# Patient Record
Sex: FEMALE | Race: WHITE | HISPANIC OR LATINO | Employment: FULL TIME | URBAN - METROPOLITAN AREA
[De-identification: names, ages, dates, MRNs, and addresses within clinical notes are randomized per-mention and may not be internally consistent; named-entity substitution may affect disease eponyms.]

---

## 2019-06-01 ENCOUNTER — OFFICE VISIT (OUTPATIENT)
Dept: URGENT CARE | Facility: CLINIC | Age: 34
End: 2019-06-01
Payer: COMMERCIAL

## 2019-06-01 VITALS
HEIGHT: 63 IN | HEART RATE: 82 BPM | DIASTOLIC BLOOD PRESSURE: 71 MMHG | TEMPERATURE: 98 F | SYSTOLIC BLOOD PRESSURE: 104 MMHG | OXYGEN SATURATION: 98 % | WEIGHT: 220 LBS | BODY MASS INDEX: 38.98 KG/M2

## 2019-06-01 DIAGNOSIS — L23.7 CONTACT DERMATITIS DUE TO POISON IVY: Primary | ICD-10-CM

## 2019-06-01 PROCEDURE — 3008F BODY MASS INDEX DOCD: CPT | Mod: CPTII,S$GLB,, | Performed by: PHYSICIAN ASSISTANT

## 2019-06-01 PROCEDURE — 3008F PR BODY MASS INDEX (BMI) DOCUMENTED: ICD-10-PCS | Mod: CPTII,S$GLB,, | Performed by: PHYSICIAN ASSISTANT

## 2019-06-01 PROCEDURE — 99203 OFFICE O/P NEW LOW 30 MIN: CPT | Mod: S$GLB,,, | Performed by: PHYSICIAN ASSISTANT

## 2019-06-01 PROCEDURE — 99203 PR OFFICE/OUTPT VISIT, NEW, LEVL III, 30-44 MIN: ICD-10-PCS | Mod: S$GLB,,, | Performed by: PHYSICIAN ASSISTANT

## 2019-06-01 RX ORDER — PREDNISONE 10 MG/1
TABLET ORAL
Qty: 40 TABLET | Refills: 0 | Status: SHIPPED | OUTPATIENT
Start: 2019-06-01 | End: 2019-06-17

## 2019-06-01 RX ORDER — CLOBETASOL PROPIONATE 0.5 MG/G
CREAM TOPICAL 2 TIMES DAILY
Qty: 30 G | Refills: 1 | Status: SHIPPED | OUTPATIENT
Start: 2019-06-01

## 2019-06-01 RX ORDER — FLUOXETINE HYDROCHLORIDE 40 MG/1
40 CAPSULE ORAL DAILY
COMMUNITY

## 2019-06-01 RX ORDER — HYDROXYZINE PAMOATE 25 MG/1
25 CAPSULE ORAL 4 TIMES DAILY
Qty: 16 CAPSULE | Refills: 0 | Status: SHIPPED | OUTPATIENT
Start: 2019-06-01

## 2019-06-01 NOTE — PROGRESS NOTES
"Subjective:       Patient ID: Anel Park is a 34 y.o. female.    Vitals:  height is 5' 3" (1.6 m) and weight is 99.8 kg (220 lb). Her temperature is 97.7 °F (36.5 °C). Her blood pressure is 104/71 and her pulse is 82. Her oxygen saturation is 98%.     Chief Complaint: Rash (poison ivy )    Pt was working in garden at home on Wednesday and came in contact with poison ivy, pt now states rash is spreading all over body and itching, no other symptoms     Rash   This is a new problem. The current episode started in the past 7 days. The problem has been gradually worsening since onset. The affected locations include the abdomen, face, neck and chest. The rash is characterized by itchiness. She was exposed to plant contact. Pertinent negatives include no congestion, cough, diarrhea, fatigue, fever, shortness of breath, sore throat or vomiting. Past treatments include antihistamine and anti-itch cream. The treatment provided mild relief.       Constitution: Negative for chills, fatigue and fever.   HENT: Negative for congestion and sore throat.    Neck: Negative for painful lymph nodes.   Cardiovascular: Negative for chest pain and leg swelling.   Eyes: Negative for double vision and blurred vision.   Respiratory: Negative for cough and shortness of breath.    Gastrointestinal: Negative for nausea, vomiting and diarrhea.   Genitourinary: Negative for dysuria, frequency, urgency and history of kidney stones.   Musculoskeletal: Negative for joint pain, joint swelling, muscle cramps and muscle ache.   Skin: Positive for rash. Negative for color change, pale, erythema and bruising.   Allergic/Immunologic: Positive for itching. Negative for seasonal allergies.   Neurological: Negative for dizziness, history of vertigo, light-headedness, passing out and headaches.   Hematologic/Lymphatic: Negative for swollen lymph nodes.   Psychiatric/Behavioral: Negative for nervous/anxious, sleep disturbance and depression. " The patient is not nervous/anxious.        Objective:      Physical Exam   Constitutional: She is oriented to person, place, and time. She appears well-developed and well-nourished.   HENT:   Head: Normocephalic and atraumatic. Head is without abrasion, without contusion and without laceration.   Right Ear: External ear normal.   Left Ear: External ear normal.   Nose: Nose normal.   Mouth/Throat: Oropharynx is clear and moist.   Eyes: Pupils are equal, round, and reactive to light. Conjunctivae, EOM and lids are normal.   Neck: Trachea normal, full passive range of motion without pain and phonation normal. Neck supple.   Cardiovascular: Normal rate, regular rhythm and normal heart sounds.   Pulmonary/Chest: Effort normal and breath sounds normal. No stridor. No respiratory distress.   Musculoskeletal: Normal range of motion.   Neurological: She is alert and oriented to person, place, and time.   Skin: Skin is warm, dry and intact. Capillary refill takes less than 2 seconds. Rash noted. No abrasion, no bruising, no burn, no ecchymosis, no laceration and no lesion noted. Rash is maculopapular. No erythema.        Psychiatric: She has a normal mood and affect. Her speech is normal and behavior is normal. Judgment and thought content normal. Cognition and memory are normal.   Nursing note and vitals reviewed.      Assessment:       1. Contact dermatitis due to poison ivy        Plan:         Contact dermatitis due to poison ivy  -     predniSONE (DELTASONE) 10 MG tablet; Take 1 tablet (10 mg total) by mouth 4 (four) times daily for 4 days, THEN 1 tablet (10 mg total) 3 (three) times daily for 4 days, THEN 1 tablet (10 mg total) 2 (two) times daily for 4 days, THEN 1 tablet (10 mg total) once daily for 4 days.  Dispense: 40 tablet; Refill: 0  -     clobetasol (TEMOVATE) 0.05 % cream; Apply topically 2 (two) times daily.  Dispense: 30 g; Refill: 1  -     hydrOXYzine pamoate (VISTARIL) 25 MG Cap; Take 1 capsule (25 mg  total) by mouth 4 (four) times daily.  Dispense: 16 capsule; Refill: 0      Patient Instructions     Please return here or go to the Emergency Department for any concerns or worsening of condition.     -  Take oral steroids  As prescribed to completion- steroids can elevate your blood pressure, elevate your blood sugar, water weight gain, nervous energy, redness to the face  -  Apply prescribed cream to affected area as directed as needed.   -  Please take over the counter Benadryl as directed for the next 24-72 hours as needed for itching unless it makes you sleepy, then you can take over the counter Allegra or Claritin or Zyrtec as directed during the daytime hours as these generally do not cause drowsiness. OR you can take prescribed vistaril as needed for itching    Can use OTC zanfel for poison ivy to help with symptoms        Managing a Poison Ivy, Poison Oak, or Poison Sumac Reaction  If you come in contact with urushiol    If you think you may have come in contact with the sap oil (called urushiol) contained in poison ivy, poison oak, or poison sumac plants, wash the affected part of your skin. Do this within 15 minutes after contact. Use water or preferably, soap and water.  Undress, and wash your clothes and gear as soon as you can. Be sure to wash any pet that was with you. Taking these steps can help prevent spreading sap oil to someone else. If you have a rash, but are not sure if it is from one of these plants, see your healthcare provider.  To soothe the itching  Your skin may react to poison oak, poison ivy, and poison sumac within hours to a few days after contact. Once you have come into contact with these plants, you cant stop the reaction. But you can take these steps to soothe the itching:  · Dont scratch or scrub your rash. Not even if the itching is severe. Scratching can lead to infection.  · Bathe in lukewarm (not hot) water. Or take short cool showers to relieve the itching. For a more  soothing bath, add oatmeal to the water.  · Use antihistamines that are taken by mouth. These include diphenhydramine. You can buy these at the pharmacy. Talk to your healthcare provider or pharmacist for more information on oral antihistamines.  · Use over-the-counter treatments on your skin. These include cortisone and calamine lotion.  How your skin may react  A mild rash may become red, swollen, and itchy. The rash may form a line on your skin where you brushed against the plant. If you have a severe rash, your itching may worsen. And your rash may blister and ooze. If this happens, seek medical care. The fluid from your blisters will not make your rash spread. With or without medical care, your rash may last up to 3 weeks. In the future, try to avoid coming in contact with these plants.  When to call your healthcare provider  Call your healthcare provider if:  · Your rash is severe  · The rash spreads beyond the exposed part of your body or affects your face.  · The rash does not clear up within a few weeks  You may be given medicine to take by mouth or apply directly on the skin.  Call 911  Call 911 if you have any of the following:  · Trouble breathing or swallowing  · Any significant swelling  Date Last Reviewed: 3/1/2017  © 4170-9643 [employer]. [address] [place] [address], [place] [address]. All rights reserved. This information is not intended as a substitute for professional medical care. Always follow your healthcare professional's instructions.

## 2019-06-01 NOTE — PATIENT INSTRUCTIONS
Please return here or go to the Emergency Department for any concerns or worsening of condition.     -  Take oral steroids  As prescribed to completion- steroids can elevate your blood pressure, elevate your blood sugar, water weight gain, nervous energy, redness to the face  -  Apply prescribed cream to affected area as directed as needed (DO NOT APPLY TO FACE OR GROIN)  -  Please take over the counter Benadryl as directed for the next 24-72 hours as needed for itching unless it makes you sleepy, then you can take over the counter Allegra or Claritin or Zyrtec as directed during the daytime hours as these generally do not cause drowsiness. OR you can take prescribed vistaril as needed for itching    Can use OTC zanfel for poison ivy to help with symptoms        Managing a Poison Ivy, Poison Oak, or Poison Sumac Reaction  If you come in contact with urushiol    If you think you may have come in contact with the sap oil (called urushiol) contained in poison ivy, poison oak, or poison sumac plants, wash the affected part of your skin. Do this within 15 minutes after contact. Use water or preferably, soap and water.  Undress, and wash your clothes and gear as soon as you can. Be sure to wash any pet that was with you. Taking these steps can help prevent spreading sap oil to someone else. If you have a rash, but are not sure if it is from one of these plants, see your healthcare provider.  To soothe the itching  Your skin may react to poison oak, poison ivy, and poison sumac within hours to a few days after contact. Once you have come into contact with these plants, you cant stop the reaction. But you can take these steps to soothe the itching:  · Dont scratch or scrub your rash. Not even if the itching is severe. Scratching can lead to infection.  · Bathe in lukewarm (not hot) water. Or take short cool showers to relieve the itching. For a more soothing bath, add oatmeal to the water.  · Use antihistamines that are  taken by mouth. These include diphenhydramine. You can buy these at the pharmacy. Talk to your healthcare provider or pharmacist for more information on oral antihistamines.  · Use over-the-counter treatments on your skin. These include cortisone and calamine lotion.  How your skin may react  A mild rash may become red, swollen, and itchy. The rash may form a line on your skin where you brushed against the plant. If you have a severe rash, your itching may worsen. And your rash may blister and ooze. If this happens, seek medical care. The fluid from your blisters will not make your rash spread. With or without medical care, your rash may last up to 3 weeks. In the future, try to avoid coming in contact with these plants.  When to call your healthcare provider  Call your healthcare provider if:  · Your rash is severe  · The rash spreads beyond the exposed part of your body or affects your face.  · The rash does not clear up within a few weeks  You may be given medicine to take by mouth or apply directly on the skin.  Call 911  Call 911 if you have any of the following:  · Trouble breathing or swallowing  · Any significant swelling  Date Last Reviewed: 3/1/2017  © 8459-2216 [employer]. [address] [place] [address], [place] [address]. All rights reserved. This information is not intended as a substitute for professional medical care. Always follow your healthcare professional's instructions.

## 2019-06-04 ENCOUNTER — TELEPHONE (OUTPATIENT)
Dept: URGENT CARE | Facility: CLINIC | Age: 34
End: 2019-06-04